# Patient Record
(demographics unavailable — no encounter records)

---

## 2025-01-12 NOTE — HISTORY OF PRESENT ILLNESS
[de-identified] : 01/10/2025: Pt is a 22yo female presenting for evaluation of L ankle injury sustained on 12/16/24. States that she fell down inverting the ankle. She was able to stand up after. She had swelling develop over the lateral aspect. She has pain at rest that worsens with walking. She has used tylenol and ice with some improvement. No bruising, fracture history, weakness, or paresthesias.   school - [employer] - spin class, gym. bike

## 2025-01-12 NOTE — HISTORY OF PRESENT ILLNESS
[de-identified] : 01/10/2025: Pt is a 20yo female presenting for evaluation of L ankle injury sustained on 12/16/24. States that she fell down inverting the ankle. She was able to stand up after. She had swelling develop over the lateral aspect. She has pain at rest that worsens with walking. She has used tylenol and ice with some improvement. No bruising, fracture history, weakness, or paresthesias.   school - [place] [employer] - spin class, gym. bike

## 2025-01-12 NOTE — PHYSICAL EXAM
[de-identified] : Constitutional: Well developed, well nourished, able to communicate Neuro: Normal sensation, No focal deficits Skin: Intact CV: Peripheral vascular exam grossly normal Pulm: No signs of respiratory distress Psych: Oriented, normal mood and affect [FreeTextEntry2] : L ankle/foot: - No obvious ankle or foot deformity - Pain with palpation posterior to medial malleolus, posterior to lateral malleolus, over ATFL - No pain with palpation of Achilles, medial or lateral malleoli, base of 5th metatarsal, navicular, metatarsals, or digits - ROM intact throughout ankle dorsiflexion, plantarflexion, inversion, eversion. Toes with normal flexion and extension. Pain with ankle inversion and eversion - 5/5 strength throughout - +1 laxity with pain on anterior drawer. Negative Kleigers, talar tilt, ankle squeeze test - Negative calcaneal and metatarsal squeeze - Distally neurovascularly intact   R ankle/foot: - No obvious ankle or foot deformity - No pain with palpation of Achilles, medial or lateral malleoli, base of 5th metatarsal, navicular, metatarsals, or digits - ROM intact throughout ankle dorsiflexion, plantarflexion, inversion, eversion. Toes with normal flexion and extension. - 5/5 strength throughout - Distally neurovascularly intact [de-identified] : 3 views of the L ankle without evidence of fracture, dislocation, or degenerative change

## 2025-01-12 NOTE — ASSESSMENT
[FreeTextEntry1] : L ankle inversion injury on 12/16/24. XR normal. Pain with anterior drawer likely sprain. Also with subsequent PTT and peroneal tenderness  - HEP and PT referral given - Recommend lace up ankle brace, wean as tolerated - Motrin, tylenol, ice as needed - Lace up ankle brace with activities for next 6 months - Discussed exercise modifications - Going back to school, advised to follow up as needed

## 2025-01-12 NOTE — REVIEW OF SYSTEMS
[Arthralgia] : arthralgia [Joint Pain] : joint pain [Joint Stiffness] : joint stiffness [Joint Swelling] : joint swelling [Negative] : Integumentary

## 2025-01-12 NOTE — PHYSICAL EXAM
[de-identified] : Constitutional: Well developed, well nourished, able to communicate Neuro: Normal sensation, No focal deficits Skin: Intact CV: Peripheral vascular exam grossly normal Pulm: No signs of respiratory distress Psych: Oriented, normal mood and affect [FreeTextEntry2] : L ankle/foot: - No obvious ankle or foot deformity - Pain with palpation posterior to medial malleolus, posterior to lateral malleolus, over ATFL - No pain with palpation of Achilles, medial or lateral malleoli, base of 5th metatarsal, navicular, metatarsals, or digits - ROM intact throughout ankle dorsiflexion, plantarflexion, inversion, eversion. Toes with normal flexion and extension. Pain with ankle inversion and eversion - 5/5 strength throughout - +1 laxity with pain on anterior drawer. Negative Kleigers, talar tilt, ankle squeeze test - Negative calcaneal and metatarsal squeeze - Distally neurovascularly intact   R ankle/foot: - No obvious ankle or foot deformity - No pain with palpation of Achilles, medial or lateral malleoli, base of 5th metatarsal, navicular, metatarsals, or digits - ROM intact throughout ankle dorsiflexion, plantarflexion, inversion, eversion. Toes with normal flexion and extension. - 5/5 strength throughout - Distally neurovascularly intact [de-identified] : 3 views of the L ankle without evidence of fracture, dislocation, or degenerative change